# Patient Record
Sex: FEMALE | Race: WHITE | NOT HISPANIC OR LATINO | Employment: OTHER | URBAN - METROPOLITAN AREA
[De-identification: names, ages, dates, MRNs, and addresses within clinical notes are randomized per-mention and may not be internally consistent; named-entity substitution may affect disease eponyms.]

---

## 2018-05-11 PROBLEM — 271737000 ANEMIA: Status: INACTIVE | Noted: 2018-05-11

## 2018-05-11 PROBLEM — 414916001 OBESITY: Status: ACTIVE | Noted: 2018-05-11

## 2018-05-13 PROBLEM — 3696007 FUNCTIONAL DYSPEPSIA: Status: ACTIVE | Noted: 2018-05-13

## 2018-05-13 PROBLEM — 236060008 SYMPTOMATIC DISORDERS OF THE GASTROINTESTINAL TRACT: Status: ACTIVE | Noted: 2018-05-13

## 2022-04-30 ENCOUNTER — TELEPHONE ENCOUNTER (OUTPATIENT)
Dept: URBAN - METROPOLITAN AREA CLINIC 121 | Facility: CLINIC | Age: 82
End: 2022-04-30

## 2022-04-30 RX ORDER — PANTOPRAZOLE SODIUM 40 MG/1
TAKE 1 PO QD TABLET, DELAYED RELEASE ORAL
OUTPATIENT
Start: 2013-09-20 | End: 2016-03-23

## 2022-04-30 RX ORDER — OMEGA-3S/DHA/EPA/FISH OIL 980-1400MG
CAPSULE,DELAYED RELEASE (ENTERIC COATED) ORAL
OUTPATIENT
Start: 2016-03-23 | End: 2019-08-19

## 2022-04-30 RX ORDER — ESOMEPRAZOLE MAGNESIUM 20 MG/1
1 CAPSULE PO QD CAPSULE, DELAYED RELEASE ORAL
OUTPATIENT
Start: 2018-06-11 | End: 2019-08-19

## 2022-04-30 RX ORDER — CALCIUM CITRATE/VITAMIN D3 315MG-5MCG
TABLET ORAL
OUTPATIENT
Start: 2013-09-20 | End: 2016-03-23

## 2022-04-30 RX ORDER — HYDROCHLOROTHIAZIDE 25 MG/1
QD TABLET ORAL
OUTPATIENT
Start: 2013-09-20

## 2022-04-30 RX ORDER — HYDROCHLOROTHIAZIDE 25 MG/1
TABLET ORAL
OUTPATIENT
Start: 2018-05-11

## 2022-04-30 RX ORDER — LEVOTHYROXINE SODIUM 112 MCG
TABLET ORAL
OUTPATIENT
Start: 2009-09-30

## 2022-04-30 RX ORDER — CALC/FA/B/D3/E/BIOFLAV/SOYBEAN 600 MG-1MG
CAPSULE ORAL
OUTPATIENT
Start: 2009-09-30

## 2022-04-30 RX ORDER — ESOMEPRAZOLE MAGNESIUM 20 MG/1
1 CAPSULE PO QD CAPSULE, DELAYED RELEASE ORAL
OUTPATIENT
Start: 2018-06-11

## 2022-04-30 RX ORDER — AZILSARTAN KAMEDOXOMIL 80 MG/1
QD TABLET ORAL
OUTPATIENT
Start: 2013-09-20

## 2022-04-30 RX ORDER — PANTOPRAZOLE SODIUM 40 MG/1
TAKE 1 TAB PO DAILY TABLET, DELAYED RELEASE ORAL
OUTPATIENT
Start: 2013-12-10

## 2022-04-30 RX ORDER — HYDRALAZINE HYDROCHLORIDE 50 MG/1
TABLET ORAL
OUTPATIENT
Start: 2016-03-23

## 2022-04-30 RX ORDER — PANTOPRAZOLE SODIUM 40 MG/1
TAKE 1 PO QD TABLET, DELAYED RELEASE ORAL
OUTPATIENT
Start: 2013-09-20

## 2022-04-30 RX ORDER — LANOLIN AND PETROLATUM .01; .97 G/G; G/G
OINTMENT TOPICAL
OUTPATIENT
Start: 2009-09-30

## 2022-04-30 RX ORDER — CYCLOSPORINE 0.5 MG/ML
1 GTT EE EMULSION OPHTHALMIC
OUTPATIENT
Start: 2013-09-20

## 2022-04-30 RX ORDER — SUCRALFATE 1 G/10ML
TAKE 2 TSP PO BEFORE MEALS SUSPENSION ORAL
OUTPATIENT
Start: 2013-12-10

## 2022-04-30 RX ORDER — LOSARTAN POTASSIUM 100 MG/1
TABLET, FILM COATED ORAL
OUTPATIENT
Start: 2016-03-23 | End: 2019-08-19

## 2022-04-30 RX ORDER — LOSARTAN POTASSIUM 100 MG/1
TABLET, FILM COATED ORAL
OUTPATIENT
Start: 2016-03-23

## 2022-04-30 RX ORDER — CHLORHEXIDINE GLUCONATE 4 %
LIQUID (ML) TOPICAL
OUTPATIENT
Start: 2016-03-23

## 2022-04-30 RX ORDER — OMEPRAZOLE 40 MG/1
TAKE ONE CAPSULE BY MOUTH TWICE DAILY CAPSULE, DELAYED RELEASE ORAL
OUTPATIENT
Start: 2013-11-04

## 2022-04-30 RX ORDER — OMEGA-3S/DHA/EPA/FISH OIL 980-1400MG
CAPSULE,DELAYED RELEASE (ENTERIC COATED) ORAL
OUTPATIENT
Start: 2016-03-23

## 2022-04-30 RX ORDER — PANTOPRAZOLE SODIUM 40 MG/1
TAKE 1 TAB PO DAILY TABLET, DELAYED RELEASE ORAL
OUTPATIENT
Start: 2013-12-10 | End: 2016-03-23

## 2022-04-30 RX ORDER — OMEPRAZOLE 20 MG/1
QD CAPSULE, DELAYED RELEASE ORAL
OUTPATIENT
Start: 2013-09-20 | End: 2016-03-23

## 2022-04-30 RX ORDER — LEVOTHYROXINE SODIUM 100 MCG
TABLET ORAL
OUTPATIENT
Start: 2016-03-23

## 2022-04-30 RX ORDER — HYDRALAZINE HYDROCHLORIDE 25 MG/1
TABLET ORAL
OUTPATIENT
Start: 2016-03-23 | End: 2019-08-19

## 2022-04-30 RX ORDER — SUCRALFATE 1 G/10ML
TAKE 2 TSP PO BEFORE MEALS SUSPENSION ORAL
OUTPATIENT
Start: 2013-12-10 | End: 2016-03-23

## 2022-04-30 RX ORDER — OMEPRAZOLE 20 MG/1
QD CAPSULE, DELAYED RELEASE ORAL
OUTPATIENT
Start: 2013-09-20

## 2022-04-30 RX ORDER — HYDRALAZINE HYDROCHLORIDE 25 MG/1
TABLET ORAL
OUTPATIENT
Start: 2016-03-23

## 2022-04-30 RX ORDER — CALCIUM CITRATE/VITAMIN D3 315MG-5MCG
TABLET ORAL
OUTPATIENT
Start: 2013-09-20

## 2022-04-30 RX ORDER — AZILSARTAN KAMEDOXOMIL 80 MG/1
QD TABLET ORAL
OUTPATIENT
Start: 2013-09-20 | End: 2016-03-23

## 2022-04-30 RX ORDER — OMEPRAZOLE 40 MG/1
TAKE ONE CAPSULE BY MOUTH TWICE DAILY CAPSULE, DELAYED RELEASE ORAL
OUTPATIENT
Start: 2013-11-04 | End: 2016-03-23

## 2022-04-30 RX ORDER — FUROSEMIDE 20 MG/1
TABLET ORAL
OUTPATIENT
Start: 2016-03-23

## 2022-04-30 RX ORDER — HYDROCHLOROTHIAZIDE 25 MG/1
TABLET ORAL
OUTPATIENT
Start: 2018-05-11 | End: 2019-08-19

## 2022-04-30 RX ORDER — FUROSEMIDE 20 MG/1
TABLET ORAL
OUTPATIENT
Start: 2016-03-23 | End: 2019-08-19

## 2022-04-30 RX ORDER — VALSARTAN AND HYDROCHLOROTHIAZIDE 160; 25 MG/1; MG/1
TABLET, FILM COATED ORAL
OUTPATIENT
Start: 2009-09-30 | End: 2013-09-20

## 2022-04-30 RX ORDER — VALSARTAN AND HYDROCHLOROTHIAZIDE 160; 25 MG/1; MG/1
TABLET, FILM COATED ORAL
OUTPATIENT
Start: 2009-09-30

## 2022-04-30 RX ORDER — CYCLOSPORINE 0.5 MG/ML
1 GTT EE EMULSION OPHTHALMIC
OUTPATIENT
Start: 2013-09-20 | End: 2016-03-23

## 2022-04-30 RX ORDER — HYDROCHLOROTHIAZIDE 25 MG/1
QD TABLET ORAL
OUTPATIENT
Start: 2013-09-20 | End: 2016-03-23

## 2022-04-30 RX ORDER — CHLORHEXIDINE GLUCONATE 4 %
LIQUID (ML) TOPICAL
OUTPATIENT
Start: 2016-03-23 | End: 2019-08-19

## 2022-05-01 ENCOUNTER — TELEPHONE ENCOUNTER (OUTPATIENT)
Dept: URBAN - METROPOLITAN AREA CLINIC 121 | Facility: CLINIC | Age: 82
End: 2022-05-01

## 2022-05-01 RX ORDER — ATORVASTATIN CALCIUM 20 MG/1
QD TABLET, FILM COATED ORAL
Status: ACTIVE | COMMUNITY
Start: 2013-09-20

## 2022-05-01 RX ORDER — METOPROLOL TARTRATE 100 MG/1
TABLET, FILM COATED ORAL
Status: ACTIVE | COMMUNITY
Start: 2016-03-23

## 2022-05-01 RX ORDER — BIMATOPROST 0.1 MG/ML
1 GTT EE SOLUTION/ DROPS OPHTHALMIC
Status: ACTIVE | COMMUNITY
Start: 2013-09-20

## 2022-05-01 RX ORDER — OLMESARTAN MEDOXOMIL 40 MG/1
TABLET, FILM COATED ORAL
Status: ACTIVE | COMMUNITY
Start: 2019-08-19

## 2022-05-01 RX ORDER — LEVOTHYROXINE SODIUM 112 MCG
TABLET ORAL
Status: ACTIVE | COMMUNITY
Start: 2016-03-23

## 2022-05-01 RX ORDER — ATORVASTATIN CALCIUM 20 MG/1
TABLET, FILM COATED ORAL
Status: ACTIVE | COMMUNITY
Start: 2019-08-19

## 2022-05-01 RX ORDER — MULTIVIT-MIN/FA/LYCOPEN/LUTEIN .4-300-25
QD TABLET ORAL
Status: ACTIVE | COMMUNITY
Start: 2013-09-20

## 2022-05-01 RX ORDER — HYDRALAZINE HYDROCHLORIDE 100 MG/1
TABLET ORAL
Status: ACTIVE | COMMUNITY
Start: 2016-03-23

## 2022-07-21 ENCOUNTER — OFFICE VISIT (OUTPATIENT)
Dept: URBAN - METROPOLITAN AREA CLINIC 27 | Facility: CLINIC | Age: 82
End: 2022-07-21
Payer: MEDICARE

## 2022-07-21 ENCOUNTER — LAB OUTSIDE AN ENCOUNTER (OUTPATIENT)
Dept: URBAN - METROPOLITAN AREA CLINIC 27 | Facility: CLINIC | Age: 82
End: 2022-07-21

## 2022-07-21 ENCOUNTER — WEB ENCOUNTER (OUTPATIENT)
Dept: URBAN - METROPOLITAN AREA CLINIC 27 | Facility: CLINIC | Age: 82
End: 2022-07-21

## 2022-07-21 ENCOUNTER — TELEPHONE ENCOUNTER (OUTPATIENT)
Dept: URBAN - METROPOLITAN AREA CLINIC 27 | Facility: CLINIC | Age: 82
End: 2022-07-21

## 2022-07-21 VITALS
HEIGHT: 62 IN | SYSTOLIC BLOOD PRESSURE: 127 MMHG | BODY MASS INDEX: 30.18 KG/M2 | WEIGHT: 164 LBS | TEMPERATURE: 97.3 F | HEART RATE: 55 BPM | DIASTOLIC BLOOD PRESSURE: 74 MMHG | RESPIRATION RATE: 17 BRPM

## 2022-07-21 DIAGNOSIS — I10 HYPERTENSION: ICD-10-CM

## 2022-07-21 DIAGNOSIS — R19.8 IRREGULAR BOWEL HABITS: ICD-10-CM

## 2022-07-21 DIAGNOSIS — Z86.010 HX OF ADENOMATOUS POLYP OF COLON: ICD-10-CM

## 2022-07-21 DIAGNOSIS — E87.1 HYPONATREMIA: ICD-10-CM

## 2022-07-21 DIAGNOSIS — E66.8 OTHER OBESITY: ICD-10-CM

## 2022-07-21 DIAGNOSIS — E03.9 HYPOTHYROIDISM: ICD-10-CM

## 2022-07-21 DIAGNOSIS — K21.9 GERD: ICD-10-CM

## 2022-07-21 DIAGNOSIS — R11.0 NAUSEA: ICD-10-CM

## 2022-07-21 PROBLEM — 235595009 GASTROESOPHAGEAL REFLUX DISEASE: Status: ACTIVE | Noted: 2022-07-21

## 2022-07-21 PROBLEM — 40930008 HYPOTHYROIDISM: Status: ACTIVE | Noted: 2022-07-21

## 2022-07-21 PROBLEM — 414916001 OBESITY: Status: ACTIVE | Noted: 2022-07-21

## 2022-07-21 PROCEDURE — 99204 OFFICE O/P NEW MOD 45 MIN: CPT | Performed by: INTERNAL MEDICINE

## 2022-07-21 RX ORDER — ATORVASTATIN CALCIUM 20 MG/1
QD TABLET, FILM COATED ORAL
Status: ACTIVE | COMMUNITY
Start: 2013-09-20

## 2022-07-21 RX ORDER — LEVOTHYROXINE SODIUM 112 MCG
TABLET ORAL
Status: ACTIVE | COMMUNITY
Start: 2016-03-23

## 2022-07-21 RX ORDER — HYDRALAZINE HYDROCHLORIDE 100 MG/1
TABLET ORAL
Status: ACTIVE | COMMUNITY
Start: 2016-03-23

## 2022-07-21 RX ORDER — OLMESARTAN MEDOXOMIL 40 MG/1
TABLET, FILM COATED ORAL
Status: ACTIVE | COMMUNITY
Start: 2019-08-19

## 2022-07-21 RX ORDER — METOPROLOL TARTRATE 100 MG/1
TABLET, FILM COATED ORAL
Status: ACTIVE | COMMUNITY
Start: 2016-03-23

## 2022-07-21 RX ORDER — MULTIVIT-MIN/FA/LYCOPEN/LUTEIN .4-300-25
QD TABLET ORAL
Status: ACTIVE | COMMUNITY
Start: 2013-09-20

## 2022-07-21 RX ORDER — POLYETHYLENE GLYCOL-3350 AND ELECTROLYTES 236; 6.74; 5.86; 2.97; 22.74 G/274.31G; G/274.31G; G/274.31G; G/274.31G; G/274.31G
AS DIRECTED POWDER, FOR SOLUTION ORAL ONCE
Qty: 1 | Refills: 0 | OUTPATIENT
Start: 2022-07-21 | End: 2022-07-22

## 2022-07-21 RX ORDER — BIMATOPROST 0.1 MG/ML
1 GTT EE SOLUTION/ DROPS OPHTHALMIC
Status: ACTIVE | COMMUNITY
Start: 2013-09-20

## 2022-07-21 NOTE — HPI-TODAY'S VISIT:
Patient here for repeat colonoscopy. Her last colonoscopy was in 2019; five adenomatous polyps were removed during that exam, and numerous adenomatous polyps were removed during a 2018 exam as well. Random biopsies and stool studies during 2018 colonoscopy were negative/normal. She is currently doing mostly well from a GI standpoint. She has a long history of bowel irregularity, both diarrhea and constipation, which usually occurs in a cyclical fashion. She denies any triggers for the irregularity. She does not take anything for this aside from PRN Colace. She does not use a fiber supplement or a probiotic. She does not have any rectal bleeding or nocturnal stools. She has infrequent reflux symptoms; she previously took esomeprazole 20mg daily but is no longer doing so. She is not adherent to an antireflux regimen. She has occasional nausea to which she attributes her blood pressure medication and "a low sodium level" (it was 124 a few months ago). PRN Dramamine is helpful. She has no other significant GI symptoms currently. There is no family history of colon cancer or polyps. She last underwent an EGD in 2014; this was unremarkable, including esophageal and gastric biopsies. She thinks that recent labs were otherwise unremarkable.

## 2022-07-22 LAB
BUN/CREATININE RATIO: (no result)
CALCIUM: 9.3
CARBON DIOXIDE: 28
CHLORIDE: 91
CREATININE: 0.89
EGFR: 65
GLUCOSE: 96
POTASSIUM: 4.4
SODIUM: 126
UREA NITROGEN (BUN): 19

## 2022-07-26 ENCOUNTER — TELEPHONE ENCOUNTER (OUTPATIENT)
Dept: URBAN - METROPOLITAN AREA CLINIC 27 | Facility: CLINIC | Age: 82
End: 2022-07-26

## 2022-08-08 ENCOUNTER — OFFICE VISIT (OUTPATIENT)
Dept: URBAN - METROPOLITAN AREA SURGERY CENTER 7 | Facility: SURGERY CENTER | Age: 82
End: 2022-08-08

## 2023-08-28 ENCOUNTER — TELEPHONE ENCOUNTER (OUTPATIENT)
Dept: URBAN - METROPOLITAN AREA CLINIC 27 | Facility: CLINIC | Age: 83
End: 2023-08-28

## 2023-08-28 ENCOUNTER — DASHBOARD ENCOUNTERS (OUTPATIENT)
Age: 83
End: 2023-08-28

## 2023-08-28 ENCOUNTER — OFFICE VISIT (OUTPATIENT)
Dept: URBAN - METROPOLITAN AREA CLINIC 27 | Facility: CLINIC | Age: 83
End: 2023-08-28
Payer: MEDICARE

## 2023-08-28 VITALS
DIASTOLIC BLOOD PRESSURE: 91 MMHG | WEIGHT: 107 LBS | RESPIRATION RATE: 16 BRPM | BODY MASS INDEX: 19.69 KG/M2 | HEIGHT: 62 IN | SYSTOLIC BLOOD PRESSURE: 125 MMHG | HEART RATE: 84 BPM

## 2023-08-28 DIAGNOSIS — R63.4 ABNORMAL WEIGHT LOSS: ICD-10-CM

## 2023-08-28 DIAGNOSIS — I10 HYPERTENSION: ICD-10-CM

## 2023-08-28 DIAGNOSIS — K62.5 RECTAL BLEEDING: ICD-10-CM

## 2023-08-28 DIAGNOSIS — Z79.01 LONG TERM CURRENT USE OF ANTICOAGULANT THERAPY: ICD-10-CM

## 2023-08-28 PROBLEM — 711150003: Status: ACTIVE | Noted: 2023-08-28

## 2023-08-28 PROBLEM — 12063002: Status: ACTIVE | Noted: 2023-08-28

## 2023-08-28 PROBLEM — 429047008 HISTORY OF ADENOMATOUS POLYP OF COLON: Status: ACTIVE | Noted: 2022-07-21

## 2023-08-28 PROBLEM — 38341003 HYPERTENSION: Status: ACTIVE | Noted: 2022-07-21

## 2023-08-28 PROBLEM — 267024001: Status: ACTIVE | Noted: 2023-08-28

## 2023-08-28 PROCEDURE — 99214 OFFICE O/P EST MOD 30 MIN: CPT | Performed by: PHYSICIAN ASSISTANT

## 2023-08-28 RX ORDER — LEVOTHYROXINE SODIUM 112 MCG
TABLET ORAL
Status: ACTIVE | COMMUNITY
Start: 2016-03-23

## 2023-08-28 RX ORDER — POLYETHYLENE GLYCOL-3350 AND ELECTROLYTES 236; 6.74; 5.86; 2.97; 22.74 G/274.31G; G/274.31G; G/274.31G; G/274.31G; G/274.31G
4000ML POWDER, FOR SOLUTION ORAL 1
Qty: 4000 MILLILITER | Refills: 0 | OUTPATIENT
Start: 2023-08-28 | End: 2023-08-29

## 2023-08-28 RX ORDER — HYDRALAZINE HYDROCHLORIDE 100 MG/1
TABLET ORAL
Status: ACTIVE | COMMUNITY
Start: 2016-03-23

## 2023-08-28 RX ORDER — MULTIVIT-MIN/FA/LYCOPEN/LUTEIN .4-300-25
QD TABLET ORAL
Status: ACTIVE | COMMUNITY
Start: 2013-09-20

## 2023-08-28 RX ORDER — METOPROLOL TARTRATE 100 MG/1
TABLET, FILM COATED ORAL
Status: ACTIVE | COMMUNITY
Start: 2016-03-23

## 2023-08-28 RX ORDER — OLMESARTAN MEDOXOMIL 40 MG/1
TABLET, FILM COATED ORAL
Status: ACTIVE | COMMUNITY
Start: 2019-08-19

## 2023-08-28 RX ORDER — ATORVASTATIN CALCIUM 20 MG/1
QD TABLET, FILM COATED ORAL
Status: ACTIVE | COMMUNITY
Start: 2013-09-20

## 2023-08-28 RX ORDER — BIMATOPROST 0.1 MG/ML
1 GTT EE SOLUTION/ DROPS OPHTHALMIC
Status: ACTIVE | COMMUNITY
Start: 2013-09-20

## 2023-08-28 NOTE — HPI-ZZZTODAY'S VISIT
Ms. Ji is an 82-year-old female patient of Dr. Giang presenting for surveillance colonoscopy.  She was seen for colonoscopy consultation 1 year ago but canceled the procedure. She is here today with her daughter to schedule colonoscopy. She has lost 30 lbs unintentionally over the last 8 mos. She denies change in bowel habits. She recently had an episode of small amt brbpr on the TP. No melena, no significant UGI sx. I have reviewed labs from 8/16 showing mild anemia (Hgb 11.1), grossly normal CMP. She had problems with low sodium last yr, this has now resolved. She is on Eliquis for A fib, denies NSAID use. . Colonoscopy 2015: 5 adenomatous polyps Colonoscopy 2018: Numerous adenomatous polyps; random biopsies and stool studies negative/normal EGD 2014 unremarkable including esophageal and gastric biopsies . FH: son just diagnosed with CRC at age 52

## 2023-09-06 ENCOUNTER — TELEPHONE ENCOUNTER (OUTPATIENT)
Dept: URBAN - METROPOLITAN AREA CLINIC 27 | Facility: CLINIC | Age: 83
End: 2023-09-06

## 2023-09-06 ENCOUNTER — CLAIMS CREATED FROM THE CLAIM WINDOW (OUTPATIENT)
Dept: URBAN - METROPOLITAN AREA SURGERY CENTER 7 | Facility: SURGERY CENTER | Age: 83
End: 2023-09-06
Payer: MEDICARE

## 2023-09-06 ENCOUNTER — CLAIMS CREATED FROM THE CLAIM WINDOW (OUTPATIENT)
Dept: URBAN - METROPOLITAN AREA CLINIC 4 | Facility: CLINIC | Age: 83
End: 2023-09-06
Payer: MEDICARE

## 2023-09-06 ENCOUNTER — LAB OUTSIDE AN ENCOUNTER (OUTPATIENT)
Dept: URBAN - METROPOLITAN AREA CLINIC 27 | Facility: CLINIC | Age: 83
End: 2023-09-06

## 2023-09-06 DIAGNOSIS — Z80.0 FAMILY HISTORY OF COLON CANCER: ICD-10-CM

## 2023-09-06 DIAGNOSIS — D12.5 ADENOMA OF SIGMOID COLON: ICD-10-CM

## 2023-09-06 DIAGNOSIS — Z86.010 ADENOMAS PERSONAL HISTORY OF COLONIC POLYPS: ICD-10-CM

## 2023-09-06 DIAGNOSIS — K57.30 DIVERTICULA OF COLON: ICD-10-CM

## 2023-09-06 DIAGNOSIS — D12.2 BENIGN NEOPLASM OF ASCENDING COLON: ICD-10-CM

## 2023-09-06 DIAGNOSIS — D12.2 ADENOMA OF ASCENDING COLON: ICD-10-CM

## 2023-09-06 DIAGNOSIS — D12.5 ADENOMATOUS POLYP OF SIGMOID COLON: ICD-10-CM

## 2023-09-06 PROCEDURE — G8907 PT DOC NO EVENTS ON DISCHARG: HCPCS | Performed by: INTERNAL MEDICINE

## 2023-09-06 PROCEDURE — 88305 TISSUE EXAM BY PATHOLOGIST: CPT | Performed by: PATHOLOGY

## 2023-09-06 PROCEDURE — 00811 ANES LWR INTST NDSC NOS: CPT

## 2023-09-06 PROCEDURE — G8907 PT DOC NO EVENTS ON DISCHARG: HCPCS | Performed by: CLINIC/CENTER

## 2023-09-06 PROCEDURE — 45380 COLONOSCOPY AND BIOPSY: CPT | Performed by: CLINIC/CENTER

## 2023-09-06 PROCEDURE — 45380 COLONOSCOPY AND BIOPSY: CPT | Performed by: INTERNAL MEDICINE

## 2023-09-06 RX ORDER — METOPROLOL TARTRATE 100 MG/1
TABLET, FILM COATED ORAL
Status: ACTIVE | COMMUNITY
Start: 2016-03-23

## 2023-09-06 RX ORDER — BIMATOPROST 0.1 MG/ML
1 GTT EE SOLUTION/ DROPS OPHTHALMIC
Status: ACTIVE | COMMUNITY
Start: 2013-09-20

## 2023-09-06 RX ORDER — OLMESARTAN MEDOXOMIL 40 MG/1
TABLET, FILM COATED ORAL
Status: ACTIVE | COMMUNITY
Start: 2019-08-19

## 2023-09-06 RX ORDER — ATORVASTATIN CALCIUM 20 MG/1
QD TABLET, FILM COATED ORAL
Status: ACTIVE | COMMUNITY
Start: 2013-09-20

## 2023-09-06 RX ORDER — MULTIVIT-MIN/FA/LYCOPEN/LUTEIN .4-300-25
QD TABLET ORAL
Status: ACTIVE | COMMUNITY
Start: 2013-09-20

## 2023-09-06 RX ORDER — LEVOTHYROXINE SODIUM 112 MCG
TABLET ORAL
Status: ACTIVE | COMMUNITY
Start: 2016-03-23

## 2023-09-06 RX ORDER — HYDRALAZINE HYDROCHLORIDE 100 MG/1
TABLET ORAL
Status: ACTIVE | COMMUNITY
Start: 2016-03-23

## 2023-09-08 LAB — RESULT:: (no result)
